# Patient Record
Sex: FEMALE | Race: WHITE | NOT HISPANIC OR LATINO | Employment: UNEMPLOYED | URBAN - METROPOLITAN AREA
[De-identification: names, ages, dates, MRNs, and addresses within clinical notes are randomized per-mention and may not be internally consistent; named-entity substitution may affect disease eponyms.]

---

## 2017-11-22 DIAGNOSIS — M25.511 ACUTE PAIN OF RIGHT SHOULDER: Primary | ICD-10-CM

## 2017-11-30 ENCOUNTER — HOSPITAL ENCOUNTER (OUTPATIENT)
Dept: RADIOLOGY | Facility: HOSPITAL | Age: 62
Discharge: HOME | End: 2017-11-30
Attending: PHYSICIAN ASSISTANT
Payer: COMMERCIAL

## 2017-11-30 ENCOUNTER — TELEPHONE (OUTPATIENT)
Dept: ORTHOPEDICS | Facility: CLINIC | Age: 62
End: 2017-11-30

## 2017-11-30 ENCOUNTER — OFFICE VISIT (OUTPATIENT)
Dept: ORTHOPEDICS | Facility: CLINIC | Age: 62
End: 2017-11-30
Payer: COMMERCIAL

## 2017-11-30 VITALS
BODY MASS INDEX: 26.72 KG/M2 | SYSTOLIC BLOOD PRESSURE: 164 MMHG | HEIGHT: 63 IN | RESPIRATION RATE: 19 BRPM | HEART RATE: 67 BPM | WEIGHT: 150.81 LBS | DIASTOLIC BLOOD PRESSURE: 78 MMHG

## 2017-11-30 DIAGNOSIS — M25.511 ACUTE PAIN OF RIGHT SHOULDER: ICD-10-CM

## 2017-11-30 DIAGNOSIS — M75.41 IMPINGEMENT SYNDROME OF RIGHT SHOULDER: Primary | ICD-10-CM

## 2017-11-30 PROCEDURE — 99999 PR PBB SHADOW E&M-EST. PATIENT-LVL IV: CPT | Mod: PBBFAC,,, | Performed by: PHYSICIAN ASSISTANT

## 2017-11-30 PROCEDURE — 73030 X-RAY EXAM OF SHOULDER: CPT | Mod: 26,RT,, | Performed by: RADIOLOGY

## 2017-11-30 PROCEDURE — 73030 X-RAY EXAM OF SHOULDER: CPT | Mod: TC,PO,RT

## 2017-11-30 PROCEDURE — 99204 OFFICE O/P NEW MOD 45 MIN: CPT | Mod: S$GLB,,, | Performed by: PHYSICIAN ASSISTANT

## 2017-11-30 RX ORDER — ZOLPIDEM TARTRATE 5 MG/1
5 TABLET ORAL
COMMUNITY
Start: 2017-11-22

## 2017-11-30 RX ORDER — INSULIN GLARGINE 300 [IU]/ML
60 INJECTION, SOLUTION SUBCUTANEOUS
COMMUNITY
Start: 2017-11-22

## 2017-11-30 RX ORDER — ROSUVASTATIN CALCIUM 10 MG/1
10 TABLET, COATED ORAL
COMMUNITY
Start: 2017-11-22 | End: 2018-11-22

## 2017-11-30 RX ORDER — AMOXICILLIN 500 MG
2 CAPSULE ORAL
COMMUNITY

## 2017-11-30 RX ORDER — PANTOPRAZOLE SODIUM 40 MG/1
40 TABLET, DELAYED RELEASE ORAL
COMMUNITY
Start: 2017-11-22

## 2017-11-30 RX ORDER — GABAPENTIN 300 MG/1
300 CAPSULE ORAL
COMMUNITY

## 2017-11-30 RX ORDER — DAPAGLIFLOZIN 10 MG/1
1 TABLET, FILM COATED ORAL
COMMUNITY
Start: 2017-11-22

## 2017-11-30 RX ORDER — MELOXICAM 7.5 MG/1
7.5 TABLET ORAL
COMMUNITY
Start: 2017-11-22 | End: 2018-11-22

## 2017-11-30 RX ORDER — TRAMADOL HYDROCHLORIDE 50 MG/1
50 TABLET ORAL
COMMUNITY
Start: 2017-11-22 | End: 2018-11-22

## 2017-11-30 RX ORDER — LISINOPRIL 20 MG/1
20 TABLET ORAL
COMMUNITY
Start: 2017-11-22

## 2017-11-30 NOTE — PROGRESS NOTES
CC: 62-year-old female right shoulder pain    HPI: No trauma, increase pain and decreased ROM for one year. Seen by PCP this week and given tramadol. Pain radiates from shoulder to elbow, increase with any motion. Better at rest. Level today 10/10    PMH:  No past medical history on file.    PSH:  No past surgical history on file.    Family Hx:  No family history on file.    Allergy:  Review of patient's allergies indicates:  Allergies not on file    Medication:  No current outpatient prescriptions on file.    Social History:    Social History     Social History    Marital status:      Spouse name: N/A    Number of children: N/A    Years of education: N/A     Occupational History    Not on file.     Social History Main Topics    Smoking status: Not on file    Smokeless tobacco: Not on file    Alcohol use Not on file    Drug use: Unknown    Sexual activity: Not on file     Other Topics Concern    Not on file     Social History Narrative    No narrative on file       Vitals:   There were no vitals taken for this visit.     ROS:  GENERAL: No fever, chills, fatigability or weight loss.  SKIN: No rashes, itching or changes in color or texture of skin.  HEAD: No headaches or recent head trauma.  EYES: Visual acuity fine. No photophobia, ocular pain or diplopia.  EARS: Denies ear pain, discharge or vertigo.  NOSE: No loss of smell, no epistaxis or postnasal drip.  MOUTH & THROAT: No hoarseness or change in voice. No excessive gum bleeding.  NODES: Denies swollen glands.  CHEST: Denies CAN, cyanosis, wheezing, cough and sputum production.  CARDIOVASCULAR: Denies chest pain, PND, orthopnea or reduced exercise tolerance.  ABDOMEN: Appetite fine. No weight loss. Denies diarrhea, abdominal pain, hematemesis or blood in stool.  URINARY: No flank pain, dysuria or hematuria.  PERIPHERAL VASCULAR: No claudication or cyanosis.  NEUROLOGIC: No history of seizures, paralysis, alteration of gait or  coordination.  MUSCULOSKELETAL: See HPI    PE:  APPEARANCE: Well nourished, well developed, in no acute distress.   HEAD: Normocephalic, atraumatic.  NEUROLOGIC: Cranial Nerves: II-XII grossly intact, also see MUSCULOSKELETAL  MUSCULOSKELETAL: right Shoulder Exam     Muscle Appearance:Normal  Grooming:Normal  Spine Alignment-Normal  Muscle Atrophy-No  Deformities-No  Tenderness-Yes  Paresthesias-No  Range of Motion-decreased         Abd Pure-decreased         Abd Combined-decreased         Ext-decreased         Flex-decreased         Internal Rot-decreased         External Rot-decreased  Muscle Strength-decreased  Sensation-normal  Reflexes-Normal  Crepitus-Yes  Impingement-Yes  Apprehension-Yes  Fatigue-Yes  Scapular Winging-No  Muscle Tightness-Yes  Instability-No  Tests on Exam-no evidence of instability  Neurovascular Status-Normal  Skin-Normal  Positive Drop Arm testYes    Assessment:           Diagnosis:              1. Right shoulder pain               2. Right shoulder impingment    Diagnostic Studies  MRI-No  X-Ray-Yes agree with Findings: The bones are osteopenic.  There is mild to moderate degenerative change of the glenohumeral and acromioclavicular joints.  No fracture or dislocation.  EMG/NCV-No  Arthrogram-No  Bone Scan-No  CT Scan-No  Doppler-No  ESR-No  CRP-No  CBC with Diff-No   Rheumatoid/Arthritis Panel-No      Plan:                                                 1. PT-no                                                 2.OT-no                                          3.NSAID-no                                        4. Narcotics-no    discussed the patient that no narcotics will be prescribed.  Continue Her current medication and see her PCP for refills.                                 5. Wound care-N/A                                 6. Rest-no                                           7. Surgery-no                                         8. SARAH Hose-no                                    9.  Anticoagulation therapy-no               10. Elevation-no                                     11. Crutches-no                                    12. Walker-no             13. Cane no                        14. Referral-no                                     15.Injection-no                            16. Splint   /    Cast   /   Cast Shoe-No              17. RICE            18. Follow up-  after the above tests.

## 2017-11-30 NOTE — TELEPHONE ENCOUNTER
Returned pt phone call. Advised pt she could come in during the morning instead of the afternoon. Pt vocalized understanding.

## 2017-11-30 NOTE — PATIENT INSTRUCTIONS
Surgery for Shoulder Impingement: Your Experience  Surgery can help free up space in your shoulder joint. This relieves symptoms of impingement. Prepare for surgery as instructed. If you dont, your surgery may have to be rescheduled. Your healthcare provider will give you instructions for recovering at home. If you have any questions, be sure to get them answered.  Preparing for surgery  Here are ways to get ready:   · As instructed, stop taking aspirin or other anti-inflammatory medicines.  · Tell your healthcare provider about all medicines and herbs you take. Ask whether you should stop taking them before surgery.  · Follow any directions you are given for not eating or drinking before surgery.  · Arrange for an adult friend or family member to give you a ride home.  Recovering from surgery  Here is what to expect:   · You will be taken to a recovery area after surgery. A healthcare provider will give you medicine to help relieve discomfort.  · If you had arthroscopy, you might go home the same day. If you had open surgery, you may need to stay overnight.  · Before leaving the surgery center or hospital, make sure you know how to care for yourself at home. Taking medicine, using ice, and keeping your arm in a sling as instructed will help you recover faster.  · It may take a few months to feel the full benefit of the surgery.  When to call the healthcare provider  After surgery, its normal to feel some shoulder pain and numbness for the first few days. But call your healthcare provider if you notice any of the following:  · Excessive pain or swelling  · Excessive drainage from the wound  · Numbness in your fingers or hand  · Increased redness near an incision  · Fever of 100.4°F (38°C) or higher, or as directed by your healthcare provider   Date Last Reviewed: 10/17/2015  © 1712-9970 [employer]. [address] [place] [address], [place] [address]. All rights reserved. This information is not intended as  a substitute for professional medical care. Always follow your healthcare professional's instructions.

## 2017-11-30 NOTE — TELEPHONE ENCOUNTER
----- Message from Johann Stringer sent at 11/30/2017  1:20 PM CST -----  Contact: [phone]  Would like to consult with nurse about sooner appt on 12/12 asked to arrive earlier; please call pt rhett at [phone]. [email or web address]

## 2017-12-07 ENCOUNTER — TELEPHONE (OUTPATIENT)
Dept: RADIOLOGY | Facility: HOSPITAL | Age: 62
End: 2017-12-07

## 2017-12-08 ENCOUNTER — TELEPHONE (OUTPATIENT)
Dept: RADIOLOGY | Facility: HOSPITAL | Age: 62
End: 2017-12-08

## 2018-01-04 ENCOUNTER — TELEPHONE (OUTPATIENT)
Dept: RADIOLOGY | Facility: HOSPITAL | Age: 63
End: 2018-01-04

## 2018-01-17 ENCOUNTER — TELEPHONE (OUTPATIENT)
Dept: ORTHOPEDICS | Facility: CLINIC | Age: 63
End: 2018-01-17

## 2018-01-17 NOTE — TELEPHONE ENCOUNTER
Called pt to r/s appt due to clinic closure. She advised she just had cardiac surgery yesterday and is unable to get out. She will call and r/s at her convenience.

## 2018-01-25 ENCOUNTER — TELEPHONE (OUTPATIENT)
Dept: ORTHOPEDICS | Facility: CLINIC | Age: 63
End: 2018-01-25

## 2018-01-25 NOTE — TELEPHONE ENCOUNTER
Attempted to contact pt x3 to reschedule discuss  Appointment d/t clinic closure d/t weather 1/18/18. No answer. Unable to leave  message as phone goes to busy signal. No voicemail .//lp